# Patient Record
Sex: FEMALE | Race: WHITE | NOT HISPANIC OR LATINO | URBAN - METROPOLITAN AREA
[De-identification: names, ages, dates, MRNs, and addresses within clinical notes are randomized per-mention and may not be internally consistent; named-entity substitution may affect disease eponyms.]

---

## 2017-10-09 PROBLEM — Z00.00 ENCOUNTER FOR PREVENTIVE HEALTH EXAMINATION: Status: ACTIVE | Noted: 2017-10-09

## 2017-10-20 ENCOUNTER — OUTPATIENT (OUTPATIENT)
Dept: OUTPATIENT SERVICES | Facility: HOSPITAL | Age: 37
LOS: 1 days | End: 2017-10-20
Payer: COMMERCIAL

## 2017-10-20 ENCOUNTER — APPOINTMENT (OUTPATIENT)
Dept: MAMMOGRAPHY | Facility: IMAGING CENTER | Age: 37
End: 2017-10-20
Payer: COMMERCIAL

## 2017-10-20 DIAGNOSIS — Z00.8 ENCOUNTER FOR OTHER GENERAL EXAMINATION: ICD-10-CM

## 2017-10-20 PROCEDURE — 19283 PERQ DEV BREAST 1ST STRTCTC: CPT

## 2017-10-20 PROCEDURE — 19283 PERQ DEV BREAST 1ST STRTCTC: CPT | Mod: 53,RT

## 2019-07-23 ENCOUNTER — OUTPATIENT (OUTPATIENT)
Dept: OUTPATIENT SERVICES | Facility: HOSPITAL | Age: 39
LOS: 1 days | End: 2019-07-23
Payer: COMMERCIAL

## 2019-07-23 VITALS
TEMPERATURE: 99 F | HEIGHT: 67 IN | HEART RATE: 72 BPM | RESPIRATION RATE: 18 BRPM | DIASTOLIC BLOOD PRESSURE: 98 MMHG | OXYGEN SATURATION: 100 % | SYSTOLIC BLOOD PRESSURE: 136 MMHG | WEIGHT: 160.94 LBS

## 2019-07-23 DIAGNOSIS — Z01.818 ENCOUNTER FOR OTHER PREPROCEDURAL EXAMINATION: ICD-10-CM

## 2019-07-23 DIAGNOSIS — N92.0 EXCESSIVE AND FREQUENT MENSTRUATION WITH REGULAR CYCLE: ICD-10-CM

## 2019-07-23 LAB
HCT VFR BLD CALC: 38.5 % — SIGNIFICANT CHANGE UP (ref 34.5–45)
HGB BLD-MCNC: 11.7 G/DL — SIGNIFICANT CHANGE UP (ref 11.5–15.5)
MCHC RBC-ENTMCNC: 23.5 PG — LOW (ref 27–34)
MCHC RBC-ENTMCNC: 30.4 GM/DL — LOW (ref 32–36)
MCV RBC AUTO: 77.5 FL — LOW (ref 80–100)
PLATELET # BLD AUTO: 249 K/UL — SIGNIFICANT CHANGE UP (ref 150–400)
RBC # BLD: 4.97 M/UL — SIGNIFICANT CHANGE UP (ref 3.8–5.2)
RBC # FLD: 15.1 % — HIGH (ref 10.3–14.5)
WBC # BLD: 7.57 K/UL — SIGNIFICANT CHANGE UP (ref 3.8–10.5)
WBC # FLD AUTO: 7.57 K/UL — SIGNIFICANT CHANGE UP (ref 3.8–10.5)

## 2019-07-23 PROCEDURE — G0463: CPT

## 2019-07-23 PROCEDURE — 85027 COMPLETE CBC AUTOMATED: CPT

## 2019-07-23 RX ORDER — LIDOCAINE HCL 20 MG/ML
0.2 VIAL (ML) INJECTION ONCE
Refills: 0 | Status: DISCONTINUED | OUTPATIENT
Start: 2019-07-25 | End: 2019-08-09

## 2019-07-23 RX ORDER — SODIUM CHLORIDE 9 MG/ML
3 INJECTION INTRAMUSCULAR; INTRAVENOUS; SUBCUTANEOUS EVERY 8 HOURS
Refills: 0 | Status: DISCONTINUED | OUTPATIENT
Start: 2019-07-25 | End: 2019-08-09

## 2019-07-23 NOTE — H&P PST ADULT - NSICDXPROBLEM_GEN_ALL_CORE_FT
PROBLEM DIAGNOSES  Problem: Excessive menstruation  Assessment and Plan: D+C, operative hysteroscopy.  Patient does not want general anesthesia, discussed with Dr. Gupta

## 2019-07-23 NOTE — H&P PST ADULT - NSICDXPASTMEDICALHX_GEN_ALL_CORE_FT
PAST MEDICAL HISTORY:  Lumbar disc herniation PAST MEDICAL HISTORY:  H/O iron deficiency anemia     Lumbar disc herniation     Menorrhagia

## 2019-07-23 NOTE — H&P PST ADULT - HISTORY OF PRESENT ILLNESS
40 y/o F PMH heavy menstruation, iron deficiency anemia, uterine polyp found on intravaginal U/S.  Presents today for dilation and curettage, operative hysteroscopy. 38 y/o F PMH heavy menstruation, iron deficiency anemia, uterine polyp found on intravaginal U/S.  Presents today for dilation and curettage, operative hysteroscopy.  Patient is requesting a spinal/epidural, does not want general anesthesia, discussed with Dr. Gupta and explained to patient that she will be accommodated to the best of our ability; however, general anesthesia is always a back up if required and patient verbalizes understanding.

## 2019-07-24 ENCOUNTER — TRANSCRIPTION ENCOUNTER (OUTPATIENT)
Age: 39
End: 2019-07-24

## 2019-07-25 ENCOUNTER — OUTPATIENT (OUTPATIENT)
Dept: OUTPATIENT SERVICES | Facility: HOSPITAL | Age: 39
LOS: 1 days | End: 2019-07-25
Payer: COMMERCIAL

## 2019-07-25 VITALS
RESPIRATION RATE: 14 BRPM | DIASTOLIC BLOOD PRESSURE: 86 MMHG | SYSTOLIC BLOOD PRESSURE: 145 MMHG | TEMPERATURE: 98 F | HEART RATE: 76 BPM | OXYGEN SATURATION: 100 %

## 2019-07-25 VITALS
DIASTOLIC BLOOD PRESSURE: 98 MMHG | WEIGHT: 160.94 LBS | SYSTOLIC BLOOD PRESSURE: 154 MMHG | HEIGHT: 67 IN | HEART RATE: 81 BPM | OXYGEN SATURATION: 100 % | TEMPERATURE: 99 F | RESPIRATION RATE: 16 BRPM

## 2019-07-25 DIAGNOSIS — N92.0 EXCESSIVE AND FREQUENT MENSTRUATION WITH REGULAR CYCLE: ICD-10-CM

## 2019-07-25 RX ORDER — SODIUM CHLORIDE 9 MG/ML
1000 INJECTION, SOLUTION INTRAVENOUS
Refills: 0 | Status: DISCONTINUED | OUTPATIENT
Start: 2019-07-25 | End: 2019-08-09

## 2019-07-25 RX ORDER — CELECOXIB 200 MG/1
200 CAPSULE ORAL ONCE
Refills: 0 | Status: COMPLETED | OUTPATIENT
Start: 2019-07-25 | End: 2019-07-25

## 2019-07-25 RX ORDER — FERROUS SULFATE 325(65) MG
1 TABLET ORAL
Qty: 0 | Refills: 0 | DISCHARGE

## 2019-07-25 RX ORDER — FAMOTIDINE 10 MG/ML
20 INJECTION INTRAVENOUS ONCE
Refills: 0 | Status: COMPLETED | OUTPATIENT
Start: 2019-07-25 | End: 2019-07-25

## 2019-07-25 RX ORDER — ONDANSETRON 8 MG/1
4 TABLET, FILM COATED ORAL ONCE
Refills: 0 | Status: DISCONTINUED | OUTPATIENT
Start: 2019-07-25 | End: 2019-08-09

## 2019-07-25 RX ORDER — CELECOXIB 200 MG/1
200 CAPSULE ORAL ONCE
Refills: 0 | Status: DISCONTINUED | OUTPATIENT
Start: 2019-07-25 | End: 2019-08-09

## 2019-07-25 RX ORDER — OXYCODONE HYDROCHLORIDE 5 MG/1
10 TABLET ORAL ONCE
Refills: 0 | Status: DISCONTINUED | OUTPATIENT
Start: 2019-07-25 | End: 2019-07-25

## 2019-07-25 RX ORDER — ACETAMINOPHEN 500 MG
1000 TABLET ORAL ONCE
Refills: 0 | Status: COMPLETED | OUTPATIENT
Start: 2019-07-25 | End: 2019-07-25

## 2019-07-25 RX ORDER — RIZATRIPTAN BENZOATE 5 MG/1
1 TABLET ORAL
Qty: 0 | Refills: 0 | DISCHARGE

## 2019-07-25 RX ORDER — OXYCODONE HYDROCHLORIDE 5 MG/1
5 TABLET ORAL ONCE
Refills: 0 | Status: DISCONTINUED | OUTPATIENT
Start: 2019-07-25 | End: 2019-07-25

## 2019-07-25 RX ADMIN — Medication 1000 MILLIGRAM(S): at 10:26

## 2019-07-25 RX ADMIN — CELECOXIB 200 MILLIGRAM(S): 200 CAPSULE ORAL at 10:25

## 2019-07-25 RX ADMIN — FAMOTIDINE 20 MILLIGRAM(S): 10 INJECTION INTRAVENOUS at 10:27

## 2019-07-25 NOTE — BRIEF OPERATIVE NOTE - NSICDXBRIEFPROCEDURE_GEN_ALL_CORE_FT
PROCEDURES:  Dilatation of cervix and curettage of uterus 25-Jul-2019 13:36:33  Casie Diggs  Operative laparoscopy with diagnostic hysteroscopy 25-Jul-2019 13:35:56  Casie Diggs  Uterine polypectomy 25-Jul-2019 13:33:08  Casie Diggs

## 2019-07-25 NOTE — PRE-ANESTHESIA EVALUATION ADULT - NSANTHOSAYNRD_GEN_A_CORE
No. FLOR screening performed.  STOP BANG Legend: 0-2 = LOW Risk; 3-4 = INTERMEDIATE Risk; 5-8 = HIGH Risk

## 2019-07-25 NOTE — ASU DISCHARGE PLAN (ADULT/PEDIATRIC) - CALL YOUR DOCTOR IF YOU HAVE ANY OF THE FOLLOWING:
Bleeding that does not stop/Fever greater than (need to indicate Fahrenheit or Celsius)/Pain not relieved by Medications/Nausea and vomiting that does not stop/Unable to urinate

## 2019-07-25 NOTE — ASU DISCHARGE PLAN (ADULT/PEDIATRIC) - ACTIVITY LEVEL
No intercourse/No douching/No tampons/No excercise/No heavy lifting/Nothing per rectum/Nothing per vagina

## 2019-07-25 NOTE — BRIEF OPERATIVE NOTE - NSICDXBRIEFPREOP_GEN_ALL_CORE_FT
PRE-OP DIAGNOSIS:  Uterine polyp 25-Jul-2019 13:37:04  Casie Diggs  Heavy periods 25-Jul-2019 13:36:56  Casie Diggs

## 2019-07-25 NOTE — ASU DISCHARGE PLAN (ADULT/PEDIATRIC) - ASU DC SPECIAL INSTRUCTIONSFT
******************************************************************************************  You were given intravenous TYLENOL for pain management at 10:25 AM. Please DO NOT take any products containing TYLENOL or ACETAMINOPHEN, such as VICODIN, PERCOCET, EXCEDRIN, and any over-the-counter cold medication for the next 6 hours (until 4:25 PM). DO NOT TAKE MORE THAN 3000 MG OF TYLENOL in a 24 hour period.   ******************************************************************************************   You were given intravenous CELEBREX for pain management at 10:25 AM. Please DO NOT take ibuprofen containing products such as MOTRIN or ADVIL, or any other NSAIDs (Non-Steroidal Anti-Inflammatory Drugs) such as ALEVE for the next 6 hours (until 4:25 PM).   ******************************************************************************************

## 2019-07-25 NOTE — ASU DISCHARGE PLAN (ADULT/PEDIATRIC) - CARE PROVIDER_API CALL
Casie Morales)  Obstetrics and Gynecology  [address] [place] [address]  Phone: [phone]  Fax: [phone]  Follow Up Time: 2 weeks

## 2019-07-26 ENCOUNTER — RESULT REVIEW (OUTPATIENT)
Age: 39
End: 2019-07-26

## 2019-07-26 PROCEDURE — 88305 TISSUE EXAM BY PATHOLOGIST: CPT

## 2019-07-26 PROCEDURE — 88305 TISSUE EXAM BY PATHOLOGIST: CPT | Mod: 26

## 2019-07-26 PROCEDURE — 58558 HYSTEROSCOPY BIOPSY: CPT

## 2019-07-30 LAB — SURGICAL PATHOLOGY STUDY: SIGNIFICANT CHANGE UP
